# Patient Record
Sex: FEMALE | Race: OTHER | URBAN - METROPOLITAN AREA
[De-identification: names, ages, dates, MRNs, and addresses within clinical notes are randomized per-mention and may not be internally consistent; named-entity substitution may affect disease eponyms.]

---

## 2024-01-11 ENCOUNTER — HOSPITAL ENCOUNTER (EMERGENCY)
Age: 24
Discharge: LEFT BEFORE DISCHARGE | End: 2024-01-11
Payer: COMMERCIAL

## 2024-01-11 VITALS
SYSTOLIC BLOOD PRESSURE: 120 MMHG | DIASTOLIC BLOOD PRESSURE: 75 MMHG | HEIGHT: 65 IN | TEMPERATURE: 98.2 F | BODY MASS INDEX: 26.66 KG/M2 | WEIGHT: 160 LBS | HEART RATE: 107 BPM | RESPIRATION RATE: 16 BRPM | OXYGEN SATURATION: 100 %

## 2024-01-11 DIAGNOSIS — Z53.21 ELOPED FROM EMERGENCY DEPARTMENT: Primary | ICD-10-CM

## 2024-01-11 PROCEDURE — 99281 EMR DPT VST MAYX REQ PHY/QHP: CPT

## 2024-01-11 NOTE — ED NOTES
Department of Emergency Medicine  FIRST PROVIDER TRIAGE NOTE             Independent MLP           1/11/24  2:49 PM EST    Date of Encounter: 1/11/24   MRN: [id number]      HPI: Aida Wang is a 23 y.o. female who presents to the ED for Pregnancy Problem (6 weeks pregnant, confirmed at OB. Now having severe cramping and spotting )     SPOTTING FOR A FEW DAYS.  CRAMPING STARTED AT 8AM THIS MORNING.   FIRST PREGNANCY.     ROS: Negative for cp or sob.    PE: Gen Appearance/Constitutional: alert  HEENT: NC/NT. PERRLA,  Airway patent.     Initial Plan of Care: All treatment areas with department are currently occupied. Plan to order/Initiate the following while awaiting opening in ED: labs.  Initiate Treatment-Testing, Proceed toTreatment Area When Bed Available for ED Attending/MLP to Continue Care    Electronically signed by TERESA Tapia CNP   DD: 1/11/24      Jermaine Espinoza APRN - CNP  01/11/24 [id number]

## 2024-01-11 NOTE — ED NOTES
Patient's mother came to desk and asked how long it would take for her daughter to be seen.  I informed her that I couldn't give an exact time but that we had someone who would be calling her to get her blood work done so that she could get her ultrasound soon.  She came back up to the desk with the patient and stated they were going to leave because she was in a lot of pain.

## 2024-01-11 NOTE — ED PROVIDER NOTES
Patient eloped from the emergency department prior to being seen by this provider.     Krystle Lerma, TERESA - CNP  01/11/24 [id number]